# Patient Record
Sex: FEMALE | Race: WHITE | NOT HISPANIC OR LATINO | Employment: FULL TIME | RURAL
[De-identification: names, ages, dates, MRNs, and addresses within clinical notes are randomized per-mention and may not be internally consistent; named-entity substitution may affect disease eponyms.]

---

## 2022-08-12 ENCOUNTER — OFFICE VISIT (OUTPATIENT)
Dept: FAMILY MEDICINE CLINIC | Facility: CLINIC | Age: 47
End: 2022-08-12

## 2022-08-12 VITALS
SYSTOLIC BLOOD PRESSURE: 122 MMHG | HEIGHT: 64 IN | WEIGHT: 155 LBS | DIASTOLIC BLOOD PRESSURE: 70 MMHG | BODY MASS INDEX: 26.46 KG/M2

## 2022-08-12 DIAGNOSIS — M54.50 ACUTE BILATERAL LOW BACK PAIN WITHOUT SCIATICA: Primary | ICD-10-CM

## 2022-08-12 PROCEDURE — 99214 OFFICE O/P EST MOD 30 MIN: CPT | Performed by: STUDENT IN AN ORGANIZED HEALTH CARE EDUCATION/TRAINING PROGRAM

## 2022-08-12 RX ORDER — ATORVASTATIN CALCIUM 80 MG/1
80 TABLET, FILM COATED ORAL NIGHTLY
COMMUNITY
Start: 2022-07-05

## 2022-08-12 RX ORDER — DICLOFENAC SODIUM 75 MG/1
75 TABLET, DELAYED RELEASE ORAL 2 TIMES DAILY
Qty: 60 TABLET | Refills: 0 | Status: SHIPPED | OUTPATIENT
Start: 2022-08-12 | End: 2022-09-21 | Stop reason: SDUPTHER

## 2022-08-12 RX ORDER — DICLOFENAC SODIUM 75 MG/1
75 TABLET, DELAYED RELEASE ORAL 2 TIMES DAILY
COMMUNITY
Start: 2022-08-07 | End: 2022-08-12 | Stop reason: SDUPTHER

## 2022-08-12 RX ORDER — CYCLOBENZAPRINE HCL 10 MG
10 TABLET ORAL EVERY 8 HOURS
Qty: 60 TABLET | Refills: 0 | Status: SHIPPED | OUTPATIENT
Start: 2022-08-12 | End: 2022-09-21 | Stop reason: SDUPTHER

## 2022-08-12 RX ORDER — NITROGLYCERIN 40 MG/1
PATCH TRANSDERMAL
COMMUNITY
Start: 2022-07-01

## 2022-08-12 RX ORDER — METHOCARBAMOL 750 MG/1
TABLET, FILM COATED ORAL
COMMUNITY
Start: 2022-07-26 | End: 2022-08-12

## 2022-08-12 RX ORDER — METOPROLOL SUCCINATE 100 MG/1
TABLET, EXTENDED RELEASE ORAL
COMMUNITY
Start: 2022-06-07

## 2022-08-12 RX ORDER — CYCLOBENZAPRINE HCL 10 MG
10 TABLET ORAL EVERY 8 HOURS
COMMUNITY
Start: 2022-08-07 | End: 2022-08-12 | Stop reason: SDUPTHER

## 2022-08-12 RX ORDER — ISOSORBIDE MONONITRATE 60 MG/1
TABLET, EXTENDED RELEASE ORAL
COMMUNITY
Start: 2022-07-01

## 2022-08-12 NOTE — PROGRESS NOTES
"Chief Complaint  Hospital Follow Up Visit    Subjective          Allyn Lou presents to [facility]  History of Present Illness    She states that she was seen in the ED at [facility] [place] on Sunday 8/7/22 for back pain. She states that she was given some medications and had a CT scan. She was told there was a possible tumor at the level of S2-3. She has not had an MRI of her low back, and has not had any follow-up for this potential tumor.    She states that her back is improved from previous, but she continues to have some pain. She has not done PT in the past for he back.     CT scan report shows a rounded soft tissue mass on the anterior aspect of the sacrum at S2-S3 region there is no evidence of muscular invasion, and only slightly more prominent than in 2018.  States that it may represent a nerve sheath type tumor such as a schwannoma.     Objective   Vital Signs:   /70 (BP Location: Left arm, Patient Position: Sitting, Cuff Size: Adult)   Ht 162.6 cm (64\")   Wt 70.3 kg (155 lb)   BMI 26.61 kg/m²     Body mass index is 26.61 kg/m².    Review of Systems    Past History:  Medical History: has no past medical history on file.   Surgical History: has a past surgical history that includes Tubal ligation (1997).   Family History: family history includes Cervical cancer in her mother and another family member; Colon cancer in her maternal grandmother; Depression in her mother; Seizures in an other family member.   Social History:       Current Outpatient Medications:   •  cyclobenzaprine (FLEXERIL) 10 MG tablet, Take 1 tablet by mouth Every 8 (Eight) Hours., Disp: 60 tablet, Rfl: 0  •  diclofenac (VOLTAREN) 75 MG EC tablet, Take 1 tablet by mouth 2 (Two) Times a Day., Disp: 60 tablet, Rfl: 0  •  atorvastatin (LIPITOR) 80 MG tablet, Take 80 mg by mouth Every Night., Disp: , Rfl:   •  isosorbide mononitrate (IMDUR) 60 MG 24 hr tablet, TAKE 1 TABLET (60 MG TOTAL) BY MOUTH 1 (ONE) TIME EACH " DAY. DO NOT CRUSH OR CHEW., Disp: , Rfl:   •  metoprolol succinate XL (TOPROL-XL) 100 MG 24 hr tablet, TAKE 1 TABLET BY MOUTH 1 TIME EACH DAY. DO NOT CRUSH OR CHEW., Disp: , Rfl:   •  nitroglycerin (NITRODUR) 0.2 MG/HR patch, PLACE 1 PATCH ON THE SKIN 1 (ONE) TIME EACH DAY. REMOVE AT NIGHT FOR AT LEAST 10 HOURS., Disp: , Rfl:     Allergies: Azithromycin, Nitrofurantoin, and Nitrofurantoin macrocrystal    Physical Exam  Constitutional:       General: She is not in acute distress.     Appearance: She is not ill-appearing or toxic-appearing.   HENT:      Head: Normocephalic and atraumatic.   Cardiovascular:      Rate and Rhythm: Normal rate and regular rhythm.      Heart sounds: No murmur heard.  Pulmonary:      Effort: Pulmonary effort is normal. No respiratory distress.   Musculoskeletal:      Comments: Muscle hypertonicity bilaterally. No sciatica. No weakness.    Neurological:      General: No focal deficit present.      Mental Status: She is alert and oriented to person, place, and time.   Psychiatric:         Mood and Affect: Mood normal.         Thought Content: Thought content normal.          Result Review :                   Assessment and Plan    Diagnoses and all orders for this visit:    1. Acute bilateral low back pain without sciatica (Primary)  -     Ambulatory Referral to Physical Therapy    Other orders  -     diclofenac (VOLTAREN) 75 MG EC tablet; Take 1 tablet by mouth 2 (Two) Times a Day.  Dispense: 60 tablet; Refill: 0  -     cyclobenzaprine (FLEXERIL) 10 MG tablet; Take 1 tablet by mouth Every 8 (Eight) Hours.  Dispense: 60 tablet; Refill: 0    Will continue diclofenac and cyclobenzaprine as she has improvement with these medications.  Will refer to physical therapy for further evaluation.  Patient will likely need MRI of the lumbar spine and SI region due to possible schwannoma to better characterize.  We will attempt to obtain at this time.      Follow Up   Return in about 4 weeks (around  9/9/2022).  Patient was given instructions and counseling regarding her condition or for health maintenance advice. Please see specific information pulled into the AVS if appropriate.     Enedina Parker, DO

## 2022-09-03 ENCOUNTER — HOSPITAL ENCOUNTER (OUTPATIENT)
Dept: MRI IMAGING | Facility: HOSPITAL | Age: 47
Discharge: HOME | End: 2022-09-03
Admitting: STUDENT IN AN ORGANIZED HEALTH CARE EDUCATION/TRAINING PROGRAM

## 2022-09-03 DIAGNOSIS — M54.50 ACUTE BILATERAL LOW BACK PAIN WITHOUT SCIATICA: ICD-10-CM

## 2022-09-03 PROCEDURE — 72148 MRI LUMBAR SPINE W/O DYE: CPT

## 2022-09-07 ENCOUNTER — TELEPHONE (OUTPATIENT)
Dept: FAMILY MEDICINE CLINIC | Facility: CLINIC | Age: 47
End: 2022-09-07

## 2022-09-07 NOTE — TELEPHONE ENCOUNTER
Caller: Allyn Lou    Relationship: Self    Best call back number: [phone]    What test was performed: MRI     When was the test performed: 09/03/22    Where was the test performed: N/A    Additional notes: PATIENT WOULD LIKE TO SEE IF RESULTS ARE AVAILABLE    PLEASE ADVISE

## 2022-09-16 ENCOUNTER — PATIENT MESSAGE (OUTPATIENT)
Dept: FAMILY MEDICINE CLINIC | Facility: CLINIC | Age: 47
End: 2022-09-16

## 2022-09-21 RX ORDER — DICLOFENAC SODIUM 75 MG/1
75 TABLET, DELAYED RELEASE ORAL 2 TIMES DAILY
Qty: 60 TABLET | Refills: 0 | Status: SHIPPED | OUTPATIENT
Start: 2022-09-21 | End: 2022-10-13 | Stop reason: SDUPTHER

## 2022-09-21 RX ORDER — CYCLOBENZAPRINE HCL 10 MG
10 TABLET ORAL EVERY 8 HOURS
Qty: 60 TABLET | Refills: 0 | Status: SHIPPED | OUTPATIENT
Start: 2022-09-21 | End: 2022-10-13 | Stop reason: SDUPTHER

## 2022-10-13 ENCOUNTER — OFFICE VISIT (OUTPATIENT)
Dept: FAMILY MEDICINE CLINIC | Facility: CLINIC | Age: 47
End: 2022-10-13

## 2022-10-13 VITALS
HEIGHT: 64 IN | BODY MASS INDEX: 27.66 KG/M2 | DIASTOLIC BLOOD PRESSURE: 74 MMHG | HEART RATE: 66 BPM | OXYGEN SATURATION: 96 % | SYSTOLIC BLOOD PRESSURE: 112 MMHG | WEIGHT: 162 LBS | RESPIRATION RATE: 18 BRPM | TEMPERATURE: 97.6 F

## 2022-10-13 DIAGNOSIS — M54.50 ACUTE BILATERAL LOW BACK PAIN WITHOUT SCIATICA: Primary | ICD-10-CM

## 2022-10-13 DIAGNOSIS — D33.4 SCHWANNOMA OF SPINAL CORD: ICD-10-CM

## 2022-10-13 PROCEDURE — 99213 OFFICE O/P EST LOW 20 MIN: CPT | Performed by: STUDENT IN AN ORGANIZED HEALTH CARE EDUCATION/TRAINING PROGRAM

## 2022-10-13 RX ORDER — DICLOFENAC SODIUM 75 MG/1
75 TABLET, DELAYED RELEASE ORAL 2 TIMES DAILY
Qty: 60 TABLET | Refills: 2 | Status: SHIPPED | OUTPATIENT
Start: 2022-10-13 | End: 2023-01-31 | Stop reason: SDUPTHER

## 2022-10-13 RX ORDER — CYCLOBENZAPRINE HCL 10 MG
10 TABLET ORAL EVERY 8 HOURS
Qty: 60 TABLET | Refills: 2 | Status: SHIPPED | OUTPATIENT
Start: 2022-10-13 | End: 2023-01-31 | Stop reason: SDUPTHER

## 2022-10-13 NOTE — PROGRESS NOTES
"Chief Complaint  MRI f/p    Subjective          Allyn Lou presents to [facility]  History of Present Illness    Patient is here for follow up on her MRI.  Patient states that she continues to have significant pain despite medications and feels as if it is getting worse.  She states that it is significantly limiting her day-to-day activities and impairing her ability to do her day-to-day tasks.    Objective   Vital Signs:   /74   Pulse 66   Temp 97.6 °F (36.4 °C)   Resp 18   Ht 162 cm (63.78\")   Wt 73.5 kg (162 lb)   SpO2 96%   BMI 28.00 kg/m²     Body mass index is 28 kg/m².    Review of Systems    Past History:  Medical History: has no past medical history on file.   Surgical History: has a past surgical history that includes Tubal ligation (1997).   Family History: family history includes Cervical cancer in her mother and another family member; Colon cancer in her maternal grandmother; Depression in her mother; Seizures in an other family member.   Social History: reports that she has been smoking cigarettes. She started smoking about 30 years ago. She has a 45.00 pack-year smoking history. She has never used smokeless tobacco.      Current Outpatient Medications:   •  atorvastatin (LIPITOR) 80 MG tablet, Take 80 mg by mouth Every Night., Disp: , Rfl:   •  cyclobenzaprine (FLEXERIL) 10 MG tablet, Take 1 tablet by mouth Every 8 (Eight) Hours., Disp: 60 tablet, Rfl: 2  •  diclofenac (VOLTAREN) 75 MG EC tablet, Take 1 tablet by mouth 2 (Two) Times a Day., Disp: 60 tablet, Rfl: 2  •  isosorbide mononitrate (IMDUR) 60 MG 24 hr tablet, TAKE 1 TABLET (60 MG TOTAL) BY MOUTH 1 (ONE) TIME EACH DAY. DO NOT CRUSH OR CHEW., Disp: , Rfl:   •  metoprolol succinate XL (TOPROL-XL) 100 MG 24 hr tablet, TAKE 1 TABLET BY MOUTH 1 TIME EACH DAY. DO NOT CRUSH OR CHEW., Disp: , Rfl:   •  nitroglycerin (NITRODUR) 0.2 MG/HR patch, PLACE 1 PATCH ON THE SKIN 1 (ONE) TIME EACH DAY. REMOVE AT " NIGHT FOR AT LEAST 10 HOURS., Disp: , Rfl:     Allergies: Azithromycin, Nitrofurantoin, and Nitrofurantoin macrocrystal    Physical Exam  Constitutional:       General: She is not in acute distress.     Appearance: She is not ill-appearing or toxic-appearing.   HENT:      Head: Normocephalic and atraumatic.   Pulmonary:      Effort: Pulmonary effort is normal. No respiratory distress.   Musculoskeletal:      Comments: Deliberate gait.    Neurological:      General: No focal deficit present.      Mental Status: She is alert and oriented to person, place, and time.   Psychiatric:         Mood and Affect: Mood normal.         Thought Content: Thought content normal.          Result Review :                   Assessment and Plan    Diagnoses and all orders for this visit:    1. Acute bilateral low back pain without sciatica (Primary)  -     Ambulatory Referral to Neurosurgery    2. Schwannoma of spinal cord (HCC)    Other orders  -     cyclobenzaprine (FLEXERIL) 10 MG tablet; Take 1 tablet by mouth Every 8 (Eight) Hours.  Dispense: 60 tablet; Refill: 2  -     diclofenac (VOLTAREN) 75 MG EC tablet; Take 1 tablet by mouth 2 (Two) Times a Day.  Dispense: 60 tablet; Refill: 2    Will refill cyclobenzaprine and diclofenac as this does help some with her symptoms.  Advised that since she continues to have significant pain and feeling of it worsening as well as significant limitations to her day-to-day that she would likely benefit from referral.  Advised that with the schwannoma and arthritic changes in her back we will send to neurosurgery for evaluation.  Patient is agreeable to this.  Patient to start physical therapy,     Follow Up   No follow-ups on file.  Patient was given instructions and counseling regarding her condition or for health maintenance advice. Please see specific information pulled into the AVS if appropriate.     Enedina Parker,

## 2022-11-09 ENCOUNTER — OFFICE VISIT (OUTPATIENT)
Dept: NEUROSURGERY | Facility: CLINIC | Age: 47
End: 2022-11-09

## 2022-11-09 VITALS — HEIGHT: 64 IN | WEIGHT: 165.2 LBS | TEMPERATURE: 97.3 F | BODY MASS INDEX: 28.2 KG/M2

## 2022-11-09 DIAGNOSIS — G96.191 TARLOV CYST: ICD-10-CM

## 2022-11-09 DIAGNOSIS — M51.36 DDD (DEGENERATIVE DISC DISEASE), LUMBAR: ICD-10-CM

## 2022-11-09 DIAGNOSIS — M54.9 MECHANICAL BACK PAIN: Primary | ICD-10-CM

## 2022-11-09 PROCEDURE — 99203 OFFICE O/P NEW LOW 30 MIN: CPT | Performed by: NEUROLOGICAL SURGERY

## 2022-11-09 NOTE — PROGRESS NOTES
Patient: Allyn Lou  : 1975    Primary Care Provider: Enedina Parker DO    Requesting Provider: As above        History    Chief Complaint: Low back and bilateral hip pain.    History of Present Illness: Ms. Lou a 47-year-old  who since August has had bothersome low back pain that extends into both hips and occasionally into the side of her right thigh.  She denies any inciting or precipitating event.  She has no numbness or weakness.  She denies bowel or bladder dysfunction.  She has not had prior back difficulties.  She does smoke.  She has been treated with muscle relaxants and NSAIDs.    Review of Systems   Constitutional: Positive for fatigue and unexpected weight change. Negative for activity change, appetite change, chills, diaphoresis and fever.   HENT: Positive for congestion, sneezing, trouble swallowing and voice change. Negative for dental problem, drooling, ear discharge, ear pain, facial swelling, hearing loss, mouth sores, nosebleeds, postnasal drip, rhinorrhea, sinus pressure, sinus pain, sore throat and tinnitus.    Eyes: Positive for discharge. Negative for photophobia, pain, redness, itching and visual disturbance.   Respiratory: Positive for cough and wheezing. Negative for apnea, choking, chest tightness, shortness of breath and stridor.    Cardiovascular: Positive for palpitations. Negative for chest pain and leg swelling.   Gastrointestinal: Positive for abdominal pain. Negative for abdominal distention, anal bleeding, blood in stool, constipation, diarrhea, nausea, rectal pain and vomiting.   Endocrine: Positive for cold intolerance and heat intolerance. Negative for polydipsia, polyphagia and polyuria.   Genitourinary: Positive for frequency, pelvic pain and urgency. Negative for decreased urine volume, difficulty urinating, dysuria, enuresis, flank pain, genital sores and hematuria.   Musculoskeletal: Positive for back pain, neck pain and neck stiffness.  "Negative for arthralgias, gait problem, joint swelling and myalgias.   Skin: Negative for color change, pallor, rash and wound.   Allergic/Immunologic: Negative for environmental allergies, food allergies and immunocompromised state.   Neurological: Positive for numbness and headaches. Negative for dizziness, tremors, seizures, syncope, facial asymmetry, speech difficulty, weakness and light-headedness.   Hematological: Negative for adenopathy. Bruises/bleeds easily.   Psychiatric/Behavioral: Positive for agitation. Negative for behavioral problems, confusion, decreased concentration, dysphoric mood, hallucinations, self-injury, sleep disturbance and suicidal ideas. The patient is not nervous/anxious and is not hyperactive.    All other systems reviewed and are negative.      The patient's past medical history, past surgical history, family history, and social history have been reviewed at length in the electronic medical record.      Physical Exam:   Temp 97.3 °F (36.3 °C) (Infrared)   Ht 162 cm (63.78\")   Wt 74.9 kg (165 lb 3.2 oz)   BMI 28.55 kg/m²   CONSTITUTIONAL: Patient is well-nourished, pleasant and appears stated age.  MUSCULOSKELETAL:  Straight leg raising is negative.  Les's Sign is negative.  ROM in the low back is somewhat limited, more so in extension.  Tenderness in the back to palpation is mildly present across her low back.  NEUROLOGICAL:  Orientation, memory, attention span, language function, and cognition have been examined and are intact.  Strength is intact in the lower extremities to direct testing.  Muscle tone is normal throughout.  Station and gait are normal.  Sensation is intact to light touch testing throughout.  Deep tendon reflexes are 2+ and symmetrical except the left ankle reflex which is 1+.  Coordination is intact.      Medical Decision Making    Data Review:   (All imaging studies were personally reviewed unless stated otherwise)  MRI of the lumbar spine dated 9/3/2022 " demonstrates diffuse degenerative disc disease and some facet arthropathy.  Tarlov cysts are noted in the sacral region.  There is some central to slightly rightward disc bulging at L5-S1.  The radiologist comments on an abnormal intensity within the neuroforamen on the right at the S2 level.  This is said to potentially represent a schwannoma.  This is poorly visualized from my standpoint.    Diagnosis:   The patient harbors mechanical low back pain due to degenerative disc and degenerative joint disease.    Treatment Options:   I have referred her to physical therapy.  Prior to follow-up in several weeks I am going to check an MRI of her lumbar spine with and without gadolinium to better find the right as to process.  If this is a schwannoma then it is asymptomatic and should be followed.       Diagnosis Plan   1. Mechanical back pain        2. DDD (degenerative disc disease), lumbar        3. Tarlov cyst            Scribed for Angelito Caba MD by Georgie Spencer MARI 11/9/2022 08:43 EST      I, Dr. Caba, personally performed the services described in the documentation, as scribed in my presence, and it is both accurate and complete.

## 2022-12-16 ENCOUNTER — HOSPITAL ENCOUNTER (OUTPATIENT)
Dept: MRI IMAGING | Facility: HOSPITAL | Age: 47
Discharge: HOME | End: 2022-12-16
Admitting: NEUROLOGICAL SURGERY

## 2022-12-16 DIAGNOSIS — G96.191 TARLOV CYST: ICD-10-CM

## 2022-12-16 PROCEDURE — A9577 INJ MULTIHANCE: HCPCS | Performed by: NEUROLOGICAL SURGERY

## 2022-12-16 PROCEDURE — 0 GADOBENATE DIMEGLUMINE 529 MG/ML SOLUTION: Performed by: NEUROLOGICAL SURGERY

## 2022-12-16 PROCEDURE — 72158 MRI LUMBAR SPINE W/O & W/DYE: CPT

## 2022-12-16 RX ADMIN — GADOBENATE DIMEGLUMINE 15 ML: 529 INJECTION, SOLUTION INTRAVENOUS at 11:00

## 2022-12-21 ENCOUNTER — OFFICE VISIT (OUTPATIENT)
Dept: NEUROSURGERY | Facility: CLINIC | Age: 47
End: 2022-12-21

## 2022-12-21 VITALS — HEIGHT: 64 IN | WEIGHT: 167 LBS | BODY MASS INDEX: 28.51 KG/M2 | RESPIRATION RATE: 16 BRPM

## 2022-12-21 DIAGNOSIS — D49.2 NERVE SHEATH TUMOR: Primary | ICD-10-CM

## 2022-12-21 DIAGNOSIS — M51.36 DDD (DEGENERATIVE DISC DISEASE), LUMBAR: ICD-10-CM

## 2022-12-21 PROBLEM — Z72.0 TOBACCO ABUSE: Status: ACTIVE | Noted: 2019-10-16

## 2022-12-21 PROBLEM — R00.2 PALPITATIONS: Status: ACTIVE | Noted: 2022-04-22

## 2022-12-21 PROBLEM — I25.10 NON-OCCLUSIVE CORONARY ARTERY DISEASE: Status: ACTIVE | Noted: 2019-10-15

## 2022-12-21 PROBLEM — E78.5 HYPERLIPIDEMIA: Status: ACTIVE | Noted: 2019-10-16

## 2022-12-21 PROCEDURE — 99213 OFFICE O/P EST LOW 20 MIN: CPT | Performed by: NEUROLOGICAL SURGERY

## 2022-12-21 NOTE — PROGRESS NOTES
Patient: Allyn Lou  : 1975    Primary Care Provider: Enedina Parker DO    Requesting Provider: As above        History    Chief Complaint: Low back and bilateral hip pain.    History of Present Illness: Ms. Lou a 47-year-old  who since August has had bothersome low back pain that extends into both hips and occasionally into the side of her right thigh.  She denies any inciting or precipitating event.  She has no numbness or weakness.  She denies bowel or bladder dysfunction.  She has not had prior back difficulties.  She does smoke.  She has been treated with muscle relaxants and NSAIDs.  She reports no change in her symptoms.    Review of Systems   Constitutional: Negative for activity change, appetite change, chills, diaphoresis, fatigue, fever and unexpected weight change.   HENT: Negative for congestion, dental problem, drooling, ear discharge, ear pain, facial swelling, hearing loss, mouth sores, nosebleeds, postnasal drip, rhinorrhea, sinus pressure, sneezing, sore throat, tinnitus, trouble swallowing and voice change.    Eyes: Negative for photophobia, pain, discharge, redness, itching and visual disturbance.   Respiratory: Negative for apnea, cough, choking, chest tightness, shortness of breath, wheezing and stridor.    Cardiovascular: Negative for chest pain, palpitations and leg swelling.   Gastrointestinal: Negative for abdominal distention, abdominal pain, anal bleeding, blood in stool, constipation, diarrhea, nausea, rectal pain and vomiting.   Endocrine: Negative for cold intolerance, heat intolerance, polydipsia, polyphagia and polyuria.   Genitourinary: Negative for decreased urine volume, difficulty urinating, dysuria, enuresis, flank pain, frequency, genital sores, hematuria and urgency.   Musculoskeletal: Negative for arthralgias, back pain, gait problem, joint swelling, myalgias, neck pain and neck stiffness.   Skin: Negative for color change, pallor, rash  "and wound.   Allergic/Immunologic: Negative for environmental allergies, food allergies and immunocompromised state.   Neurological: Negative for dizziness, tremors, seizures, syncope, facial asymmetry, speech difficulty, weakness, light-headedness, numbness and headaches.   Hematological: Negative for adenopathy. Does not bruise/bleed easily.   Psychiatric/Behavioral: Negative for agitation, behavioral problems, confusion, decreased concentration, dysphoric mood, hallucinations, self-injury, sleep disturbance and suicidal ideas. The patient is not nervous/anxious and is not hyperactive.    All other systems reviewed and are negative.      The patient's past medical history, past surgical history, family history, and social history have been reviewed at length in the electronic medical record.      Physical Exam:   Resp 16   Ht 162 cm (63.78\")   Wt 75.8 kg (167 lb)   BMI 28.86 kg/m²   Deferred    Medical Decision Making    Data Review:   (All imaging studies were personally reviewed unless stated otherwise)  MRI study demonstrates the rounded lesion actually sitting on the right and ventral to the sacrum at the S2-3 level.  The radiologist measured this continuously enhancing lesion at 2.7 cm.  The radiologist thought it was associated with the S2 nerve root which is possible.  Tarlov cysts are noted within the canal at the S2 level.  There is diffuse degenerative disc disease and some facet arthropathy.    Diagnosis:   1.  Mechanical low back pain.  2.  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    Treatment Options:   I recommend symptomatic treatment for her mechanical back pain.  She will follow-up in 8 months with a new MRI of the lumbar spine with and without gadolinium to ensure stability of the suspected nerve sheath tumor.       Diagnosis Plan   1. Nerve sheath tumor        2. DDD (degenerative disc disease), lumbar            Scribed for Angelito Caba, " MD by Jennie Umaña, TRAVIS on 12/21/2022 15:22 EST       I, Dr. Caba, personally performed the services described in the documentation, as scribed in my presence, and it is both accurate and complete.

## 2023-01-30 ENCOUNTER — PATIENT MESSAGE (OUTPATIENT)
Dept: FAMILY MEDICINE CLINIC | Facility: CLINIC | Age: 48
End: 2023-01-30
Payer: COMMERCIAL

## 2023-01-30 RX ORDER — CYCLOBENZAPRINE HCL 10 MG
10 TABLET ORAL EVERY 8 HOURS
Qty: 60 TABLET | Refills: 2 | Status: CANCELLED | OUTPATIENT
Start: 2023-01-30

## 2023-01-30 RX ORDER — DICLOFENAC SODIUM 75 MG/1
75 TABLET, DELAYED RELEASE ORAL 2 TIMES DAILY
Qty: 60 TABLET | Refills: 2 | Status: CANCELLED | OUTPATIENT
Start: 2023-01-30

## 2023-01-31 RX ORDER — CYCLOBENZAPRINE HCL 10 MG
10 TABLET ORAL EVERY 8 HOURS
Qty: 60 TABLET | Refills: 2 | Status: SHIPPED | OUTPATIENT
Start: 2023-01-31

## 2023-01-31 RX ORDER — DICLOFENAC SODIUM 75 MG/1
75 TABLET, DELAYED RELEASE ORAL 2 TIMES DAILY
Qty: 60 TABLET | Refills: 2 | Status: SHIPPED | OUTPATIENT
Start: 2023-01-31

## 2023-06-05 RX ORDER — CYCLOBENZAPRINE HCL 10 MG
TABLET ORAL
Qty: 60 TABLET | Refills: 0 | Status: SHIPPED | OUTPATIENT
Start: 2023-06-05

## 2023-07-07 PROBLEM — R74.8 ELEVATED LIVER ENZYMES: Status: ACTIVE | Noted: 2023-05-26

## 2023-08-29 ENCOUNTER — OFFICE VISIT (OUTPATIENT)
Dept: FAMILY MEDICINE CLINIC | Facility: CLINIC | Age: 48
End: 2023-08-29
Payer: COMMERCIAL

## 2023-08-29 VITALS
DIASTOLIC BLOOD PRESSURE: 70 MMHG | BODY MASS INDEX: 29.95 KG/M2 | WEIGHT: 169 LBS | SYSTOLIC BLOOD PRESSURE: 122 MMHG | HEIGHT: 63 IN | OXYGEN SATURATION: 96 % | HEART RATE: 75 BPM

## 2023-08-29 DIAGNOSIS — N30.00 ACUTE CYSTITIS WITHOUT HEMATURIA: ICD-10-CM

## 2023-08-29 DIAGNOSIS — R30.0 DYSURIA: Primary | ICD-10-CM

## 2023-08-29 LAB
BILIRUB BLD-MCNC: NEGATIVE MG/DL
CLARITY, POC: CLEAR
COLOR UR: YELLOW
EXPIRATION DATE: ABNORMAL
GLUCOSE UR STRIP-MCNC: NEGATIVE MG/DL
KETONES UR QL: NEGATIVE
LEUKOCYTE EST, POC: ABNORMAL
Lab: ABNORMAL
NITRITE UR-MCNC: NEGATIVE MG/ML
PH UR: 7 [PH] (ref 5–8)
PROT UR STRIP-MCNC: NEGATIVE MG/DL
RBC # UR STRIP: ABNORMAL /UL
SP GR UR: 1.02 (ref 1–1.03)
UROBILINOGEN UR QL: NORMAL

## 2023-08-29 PROCEDURE — 81003 URINALYSIS AUTO W/O SCOPE: CPT | Performed by: STUDENT IN AN ORGANIZED HEALTH CARE EDUCATION/TRAINING PROGRAM

## 2023-08-29 PROCEDURE — 99213 OFFICE O/P EST LOW 20 MIN: CPT | Performed by: STUDENT IN AN ORGANIZED HEALTH CARE EDUCATION/TRAINING PROGRAM

## 2023-08-29 RX ORDER — OMEPRAZOLE 20 MG/1
20 CAPSULE, DELAYED RELEASE ORAL DAILY
Qty: 30 CAPSULE | Refills: 1 | OUTPATIENT
Start: 2023-08-29

## 2023-08-29 RX ORDER — OMEPRAZOLE 20 MG/1
20 CAPSULE, DELAYED RELEASE ORAL DAILY
Qty: 30 CAPSULE | Refills: 1 | Status: SHIPPED | OUTPATIENT
Start: 2023-08-29

## 2023-08-29 RX ORDER — CEPHALEXIN 500 MG/1
500 CAPSULE ORAL 2 TIMES DAILY
Qty: 14 CAPSULE | Refills: 0 | Status: SHIPPED | OUTPATIENT
Start: 2023-08-29

## 2023-08-29 NOTE — PROGRESS NOTES
"Chief Complaint  Urinary Tract Infection    Subjective          Allyn Lou presents to [facility]     Patient presents to the office for evaluation of burning with urination. She states that it is not al the time, but it is bothersome to her a lot of the time. She states that [place], and the pain has been going on for about 1 month on and off, but has gotten worse despite over the counter medications.     Objective   Vital Signs:   /70   Pulse 75   Ht 160 cm (63\")   Wt 76.7 kg (169 lb)   SpO2 96%   BMI 29.94 kg/mý     Body mass index is 29.94 kg/mý.    Review of Systems   Constitutional:  Negative for chills.   Gastrointestinal:  Negative for nausea and vomiting.   Genitourinary:  Positive for dysuria, flank pain, frequency and urgency. Negative for hematuria.     Past History:  Medical History: has a past medical history of Arthritis (2018), Cervical disc disorder (August 2021), Coronary artery disease (2019), CTS (carpal tunnel syndrome) (2022), Headache (2019), Low back pain (2021), and Lumbosacral disc disease (2021).   Surgical History: has a past surgical history that includes Tubal ligation (1997).   Family History: family history includes COPD in her father; Cancer in her mother; Cervical cancer in her mother and another family member; Colon cancer in her maternal grandmother; Depression in her mother; Diabetes in her maternal grandfather; Early death in her sister; Heart disease in her sister; Seizures in an other family member.   Social History: reports that she has been smoking cigarettes. She started smoking about 31 years ago. She has a 32.00 pack-year smoking history. She has never used smokeless tobacco. She reports that she does not currently use alcohol. She reports that she does not use drugs.      Current Outpatient Medications:     atorvastatin (LIPITOR) 80 MG tablet, Take 1 tablet by mouth Every Night., Disp: , Rfl:     " cephalexin (Keflex) 500 MG capsule, Take 1 capsule by mouth 2 (Two) Times a Day., Disp: 14 capsule, Rfl: 0    cyclobenzaprine (FLEXERIL) 10 MG tablet, TAKE 1 TABLET BY MOUTH EVERY 8 HOURS, Disp: 60 tablet, Rfl: 0    diclofenac (VOLTAREN) 75 MG EC tablet, Take 1 tablet by mouth 2 (Two) Times a Day., Disp: 60 tablet, Rfl: 2    isosorbide mononitrate (IMDUR) 60 MG 24 hr tablet, TAKE 1 TABLET (60 MG TOTAL) BY MOUTH 1 (ONE) TIME EACH DAY. DO NOT CRUSH OR CHEW., Disp: , Rfl:     metoprolol succinate XL (TOPROL-XL) 100 MG 24 hr tablet, TAKE 1 TABLET BY MOUTH 1 TIME EACH DAY. DO NOT CRUSH OR CHEW., Disp: , Rfl:     nitroglycerin (NITRODUR) 0.2 MG/HR patch, PLACE 1 PATCH ON THE SKIN 1 (ONE) TIME EACH DAY. REMOVE AT NIGHT FOR AT LEAST 10 HOURS., Disp: , Rfl:     omeprazole (priLOSEC) 20 MG capsule, Take 1 capsule by mouth Daily., Disp: 30 capsule, Rfl: 1    vitamin D3 125 MCG (5000 UT) capsule capsule, Take 1 capsule by mouth Daily., Disp: , Rfl:     Allergies: Azithromycin, Nitrofurantoin, and Nitrofurantoin macrocrystal    Physical Exam  Constitutional:       General: She is not in acute distress.     Appearance: She is not toxic-appearing.   Cardiovascular:      Rate and Rhythm: Normal rate and regular rhythm.   Pulmonary:      Effort: Pulmonary effort is normal.      Breath sounds: Normal breath sounds.   Abdominal:      General: Abdomen is flat.      Palpations: Abdomen is soft.      Tenderness: There is abdominal tenderness (suprapubic). There is no right CVA tenderness or left CVA tenderness.   Neurological:      Mental Status: She is alert.   Psychiatric:         Mood and Affect: Mood normal.        Result Review :                   Assessment and Plan    Diagnoses and all orders for this visit:    1. Dysuria (Primary)  -     POCT urinalysis dipstick, automated  -     Urine Culture - Urine, Urine, Clean Catch    2. Acute cystitis without hematuria    Other orders  -     cephalexin (Keflex) 500 MG capsule; Take 1  capsule by mouth 2 (Two) Times a Day.  Dispense: 14 capsule; Refill: 0    UTI on dip. Culture sent. Will follow results. Will treat with Keflex. Call with worsening symptoms or medication intolerance.      Follow Up   No follow-ups on file.  Patient was given instructions and counseling regarding her condition or for health maintenance advice. Please see specific information pulled into the AVS if appropriate.     Enedina Parker, DO

## 2023-09-02 LAB
BACTERIA UR CULT: ABNORMAL
BACTERIA UR CULT: ABNORMAL
OTHER ANTIBIOTIC SUSC ISLT: ABNORMAL

## 2023-09-08 ENCOUNTER — HOSPITAL ENCOUNTER (OUTPATIENT)
Dept: MRI IMAGING | Facility: HOSPITAL | Age: 48
Discharge: HOME | End: 2023-09-08
Admitting: NEUROLOGICAL SURGERY
Payer: COMMERCIAL

## 2023-09-08 DIAGNOSIS — D49.2 NERVE SHEATH TUMOR: ICD-10-CM

## 2023-09-08 PROCEDURE — 72158 MRI LUMBAR SPINE W/O & W/DYE: CPT

## 2023-09-08 PROCEDURE — 0 GADOBENATE DIMEGLUMINE 529 MG/ML SOLUTION: Performed by: NEUROLOGICAL SURGERY

## 2023-09-08 PROCEDURE — A9577 INJ MULTIHANCE: HCPCS | Performed by: NEUROLOGICAL SURGERY

## 2023-09-08 RX ADMIN — GADOBENATE DIMEGLUMINE 15 ML: 529 INJECTION, SOLUTION INTRAVENOUS at 09:38

## 2023-09-15 ENCOUNTER — OFFICE VISIT (OUTPATIENT)
Dept: NEUROSURGERY | Facility: CLINIC | Age: 48
End: 2023-09-15
Payer: COMMERCIAL

## 2023-09-15 VITALS — BODY MASS INDEX: 29.27 KG/M2 | WEIGHT: 165.2 LBS | HEIGHT: 63 IN

## 2023-09-15 DIAGNOSIS — D49.2 NERVE SHEATH TUMOR: Primary | ICD-10-CM

## 2023-09-15 RX ORDER — ISOSORBIDE MONONITRATE 30 MG/1
TABLET, EXTENDED RELEASE ORAL
COMMUNITY
Start: 2023-08-26

## 2023-09-28 ENCOUNTER — TELEPHONE (OUTPATIENT)
Dept: FAMILY MEDICINE CLINIC | Facility: CLINIC | Age: 48
End: 2023-09-28
Payer: COMMERCIAL

## 2023-09-28 DIAGNOSIS — R74.8 ELEVATED LIVER ENZYMES: ICD-10-CM

## 2023-09-28 DIAGNOSIS — R00.2 PALPITATIONS: ICD-10-CM

## 2023-09-28 DIAGNOSIS — R30.0 DYSURIA: Primary | ICD-10-CM

## 2023-09-28 DIAGNOSIS — E78.2 MIXED HYPERLIPIDEMIA: ICD-10-CM

## 2023-09-29 ENCOUNTER — LAB (OUTPATIENT)
Dept: FAMILY MEDICINE CLINIC | Facility: CLINIC | Age: 48
End: 2023-09-29
Payer: COMMERCIAL

## 2023-09-30 LAB
ALBUMIN SERPL-MCNC: 4.3 G/DL (ref 3.9–4.9)
ALBUMIN/GLOB SERPL: 2 {RATIO} (ref 1.2–2.2)
ALP SERPL-CCNC: 129 IU/L (ref 44–121)
ALT SERPL-CCNC: 25 IU/L (ref 0–32)
AST SERPL-CCNC: 22 IU/L (ref 0–40)
BASOPHILS # BLD AUTO: 0.1 X10E3/UL (ref 0–0.2)
BASOPHILS NFR BLD AUTO: 1 %
BILIRUB SERPL-MCNC: 0.4 MG/DL (ref 0–1.2)
BUN SERPL-MCNC: 7 MG/DL (ref 6–24)
BUN/CREAT SERPL: 10 (ref 9–23)
CALCIUM SERPL-MCNC: 9.6 MG/DL (ref 8.7–10.2)
CHLORIDE SERPL-SCNC: 105 MMOL/L (ref 96–106)
CHOLEST SERPL-MCNC: 121 MG/DL (ref 100–199)
CO2 SERPL-SCNC: 23 MMOL/L (ref 20–29)
CREAT SERPL-MCNC: 0.71 MG/DL (ref 0.57–1)
EGFRCR SERPLBLD CKD-EPI 2021: 105 ML/MIN/1.73
EOSINOPHIL # BLD AUTO: 0.2 X10E3/UL (ref 0–0.4)
EOSINOPHIL NFR BLD AUTO: 2 %
ERYTHROCYTE [DISTWIDTH] IN BLOOD BY AUTOMATED COUNT: 12.8 % (ref 11.7–15.4)
GLOBULIN SER CALC-MCNC: 2.2 G/DL (ref 1.5–4.5)
GLUCOSE SERPL-MCNC: 99 MG/DL (ref 70–99)
HCT VFR BLD AUTO: 45.5 % (ref 34–46.6)
HDLC SERPL-MCNC: 34 MG/DL
HGB BLD-MCNC: 15.2 G/DL (ref 11.1–15.9)
IMM GRANULOCYTES # BLD AUTO: 0 X10E3/UL (ref 0–0.1)
IMM GRANULOCYTES NFR BLD AUTO: 0 %
LDLC SERPL CALC-MCNC: 63 MG/DL (ref 0–99)
LYMPHOCYTES # BLD AUTO: 2.2 X10E3/UL (ref 0.7–3.1)
LYMPHOCYTES NFR BLD AUTO: 30 %
MCH RBC QN AUTO: 30.2 PG (ref 26.6–33)
MCHC RBC AUTO-ENTMCNC: 33.4 G/DL (ref 31.5–35.7)
MCV RBC AUTO: 91 FL (ref 79–97)
MONOCYTES # BLD AUTO: 0.5 X10E3/UL (ref 0.1–0.9)
MONOCYTES NFR BLD AUTO: 7 %
NEUTROPHILS # BLD AUTO: 4.6 X10E3/UL (ref 1.4–7)
NEUTROPHILS NFR BLD AUTO: 60 %
PLATELET # BLD AUTO: 221 X10E3/UL (ref 150–450)
POTASSIUM SERPL-SCNC: 3.9 MMOL/L (ref 3.5–5.2)
PROT SERPL-MCNC: 6.5 G/DL (ref 6–8.5)
RBC # BLD AUTO: 5.03 X10E6/UL (ref 3.77–5.28)
SODIUM SERPL-SCNC: 141 MMOL/L (ref 134–144)
T4 FREE SERPL-MCNC: 1.05 NG/DL (ref 0.82–1.77)
TRIGL SERPL-MCNC: 132 MG/DL (ref 0–149)
TSH SERPL DL<=0.005 MIU/L-ACNC: 3.16 UIU/ML (ref 0.45–4.5)
VLDLC SERPL CALC-MCNC: 24 MG/DL (ref 5–40)
WBC # BLD AUTO: 7.5 X10E3/UL (ref 3.4–10.8)

## 2023-10-06 ENCOUNTER — OFFICE VISIT (OUTPATIENT)
Dept: FAMILY MEDICINE CLINIC | Facility: CLINIC | Age: 48
End: 2023-10-06
Payer: COMMERCIAL

## 2023-10-06 VITALS
OXYGEN SATURATION: 97 % | BODY MASS INDEX: 29.41 KG/M2 | HEIGHT: 63 IN | SYSTOLIC BLOOD PRESSURE: 124 MMHG | DIASTOLIC BLOOD PRESSURE: 68 MMHG | HEART RATE: 75 BPM | WEIGHT: 166 LBS

## 2023-10-06 DIAGNOSIS — R30.0 DYSURIA: ICD-10-CM

## 2023-10-06 DIAGNOSIS — Z12.31 ENCOUNTER FOR SCREENING MAMMOGRAM FOR MALIGNANT NEOPLASM OF BREAST: ICD-10-CM

## 2023-10-06 DIAGNOSIS — E78.2 MIXED HYPERLIPIDEMIA: ICD-10-CM

## 2023-10-06 DIAGNOSIS — Z00.00 WELL ADULT EXAM: Primary | ICD-10-CM

## 2023-10-06 DIAGNOSIS — Z12.11 COLON CANCER SCREENING: ICD-10-CM

## 2023-10-06 LAB
BILIRUB BLD-MCNC: NEGATIVE MG/DL
CLARITY, POC: CLEAR
COLOR UR: YELLOW
EXPIRATION DATE: ABNORMAL
GLUCOSE UR STRIP-MCNC: NEGATIVE MG/DL
KETONES UR QL: NEGATIVE
LEUKOCYTE EST, POC: ABNORMAL
Lab: ABNORMAL
NITRITE UR-MCNC: NEGATIVE MG/ML
PH UR: 6.5 [PH] (ref 5–8)
PROT UR STRIP-MCNC: NEGATIVE MG/DL
RBC # UR STRIP: ABNORMAL /UL
SP GR UR: 1.02 (ref 1–1.03)
UROBILINOGEN UR QL: NORMAL

## 2023-10-06 RX ORDER — CEPHALEXIN 500 MG/1
500 CAPSULE ORAL 2 TIMES DAILY
Qty: 10 CAPSULE | Refills: 0 | Status: SHIPPED | OUTPATIENT
Start: 2023-10-06

## 2023-10-06 NOTE — PROGRESS NOTES
"Chief Complaint  Annual Exam    Subjective          Allyn Lou presents to [facility]  History of Present Illness    Patient is here for physical.     She states that she is overall doing ok at this time. She states that she is tolerating all of her medications without any side effects.     She states that she has been having trouble with her bladder. She notes that she has had increased urinary frequency and burning. She states that this has been over the past few days.     She is due for colon cancer screening and breast cancer screening at this time.       Objective   Vital Signs:   /68   Pulse 75   Ht 160 cm (63\")   Wt 75.3 kg (166 lb)   SpO2 97%   BMI 29.41 kg/m²     Body mass index is 29.41 kg/m².    Review of Systems    Past History:  Medical History: has a past medical history of Arthritis (2018), Cervical disc disorder (August 2021), Coronary artery disease (2019), CTS (carpal tunnel syndrome) (2022), Headache (2019), Low back pain (2021), and Lumbosacral disc disease (2021).   Surgical History: has a past surgical history that includes Tubal ligation (1997).   Family History: family history includes Arthritis in her maternal grandmother; COPD in her father; Cancer in her maternal grandmother and mother; Cervical cancer in her mother and another family member; Colon cancer in her maternal grandmother; Depression in her daughter and mother; Diabetes in her maternal grandfather; Early death in her sister; Heart disease in her maternal grandfather, sister, and sister; Seizures in an other family member.   Social History: reports that she has been smoking cigarettes. She started smoking about 31 years ago. She has a 32.00 pack-year smoking history. She has never used smokeless tobacco. She reports that she does not currently use alcohol. She reports that she does not use drugs.      Current Outpatient Medications:     atorvastatin (LIPITOR) 80 MG tablet, Take 1 " tablet by mouth Every Night., Disp: , Rfl:     cephalexin (Keflex) 500 MG capsule, Take 1 capsule by mouth 2 (Two) Times a Day., Disp: 10 capsule, Rfl: 0    cyclobenzaprine (FLEXERIL) 10 MG tablet, TAKE 1 TABLET BY MOUTH EVERY 8 HOURS, Disp: 60 tablet, Rfl: 0    diclofenac (VOLTAREN) 75 MG EC tablet, Take 1 tablet by mouth 2 (Two) Times a Day., Disp: 60 tablet, Rfl: 2    isosorbide mononitrate (IMDUR) 30 MG 24 hr tablet, TAKE 1 TABLET BY MOUTH ONCE DAILY WITH A 60 MG FOR A TOTAL OF 90 MG, Disp: , Rfl:     metoprolol succinate XL (TOPROL-XL) 100 MG 24 hr tablet, TAKE 1 TABLET BY MOUTH 1 TIME EACH DAY. DO NOT CRUSH OR CHEW., Disp: , Rfl:     nitroglycerin (NITRODUR) 0.2 MG/HR patch, PLACE 1 PATCH ON THE SKIN 1 (ONE) TIME EACH DAY. REMOVE AT NIGHT FOR AT LEAST 10 HOURS., Disp: , Rfl:     omeprazole (priLOSEC) 20 MG capsule, Take 1 capsule by mouth Daily., Disp: 30 capsule, Rfl: 1    vitamin D3 125 MCG (5000 UT) capsule capsule, Take 1 capsule by mouth Daily., Disp: , Rfl:     Allergies: Azithromycin, Nitrofurantoin, and Nitrofurantoin macrocrystal    Physical Exam  Constitutional:       General: She is not in acute distress.     Appearance: She is not ill-appearing or toxic-appearing.   HENT:      Head: Normocephalic and atraumatic.   Cardiovascular:      Rate and Rhythm: Normal rate and regular rhythm.      Heart sounds: No murmur heard.  Pulmonary:      Effort: Pulmonary effort is normal. No respiratory distress.   Neurological:      General: No focal deficit present.      Mental Status: She is alert and oriented to person, place, and time.   Psychiatric:         Mood and Affect: Mood normal.         Thought Content: Thought content normal.        Result Review :                   Assessment and Plan    Diagnoses and all orders for this visit:    1. Well adult exam (Primary)  -     Comprehensive Metabolic Panel; Future  -     CBC & Differential; Future  -     TSH; Future  -     T4, Free; Future    2. Mixed  hyperlipidemia  -     Lipid Panel; Future    3. Dysuria  -     Urine Culture - Urine, Urine, Clean Catch  -     POCT urinalysis dipstick, automated  -     cephalexin (Keflex) 500 MG capsule; Take 1 capsule by mouth 2 (Two) Times a Day.  Dispense: 10 capsule; Refill: 0    4. Colon cancer screening  -     Cologuard - Stool, Per Rectum; Future    5. Encounter for screening mammogram for malignant neoplasm of breast  -     Mammo Screening Bilateral With CAD; Future    Other orders  -     Fluzone >6 Months (8064-1192)    UA with possible infection. Will do Keflex and contact with culture when available.     Cologuard and Mammogram ordered.     Flu shot given.     Blood work reviewed and discussed with patient. Will order blood work for prior to next visit.     Past medical and surgical history as well as allergies, family history and social history were reviewed, and discussed with patient.  Chronic conditions were reviewed as well as medications.   Anticipatory guidance handouts including healthy diet, health maintenance, as well as regular exercise and general instructions were given via [employer], and patient was able to ask questions and discuss any concerns.          Follow Up   No follow-ups on file.  Patient was given instructions and counseling regarding her condition or for health maintenance advice. Please see specific information pulled into the AVS if appropriate.     Enedina Parker, DO

## 2023-10-11 LAB
BACTERIA UR CULT: ABNORMAL
BACTERIA UR CULT: ABNORMAL
OTHER ANTIBIOTIC SUSC ISLT: ABNORMAL

## 2023-11-16 DIAGNOSIS — R92.8 ABNORMAL MAMMOGRAM: Primary | ICD-10-CM

## 2024-10-08 ENCOUNTER — TELEPHONE (OUTPATIENT)
Dept: FAMILY MEDICINE CLINIC | Facility: CLINIC | Age: 49
End: 2024-10-08

## 2024-10-08 NOTE — TELEPHONE ENCOUNTER
LEFT DETAILED VM  HUB TO RELAY    Need to know if pt had her diagnostic mammogram last year and if so, where was it done at?